# Patient Record
Sex: MALE | Race: OTHER | URBAN - METROPOLITAN AREA
[De-identification: names, ages, dates, MRNs, and addresses within clinical notes are randomized per-mention and may not be internally consistent; named-entity substitution may affect disease eponyms.]

---

## 2018-05-07 ENCOUNTER — EMERGENCY (EMERGENCY)
Facility: HOSPITAL | Age: 23
LOS: 1 days | Discharge: HOME | End: 2018-05-07
Admitting: EMERGENCY MEDICAL TECHNICIAN, BASIC

## 2018-05-07 VITALS
OXYGEN SATURATION: 98 % | HEART RATE: 76 BPM | DIASTOLIC BLOOD PRESSURE: 76 MMHG | SYSTOLIC BLOOD PRESSURE: 134 MMHG | RESPIRATION RATE: 18 BRPM

## 2018-05-07 DIAGNOSIS — K08.89 OTHER SPECIFIED DISORDERS OF TEETH AND SUPPORTING STRUCTURES: ICD-10-CM

## 2018-05-07 NOTE — CONSULT NOTE ADULT - SUBJECTIVE AND OBJECTIVE BOX
Patient is a 22y old  Male who presents with a chief complaint of pain in lower right from wisdom tooth.    HPI: Patient reports has cavity in tooth #32 and wants to make an appointment to extract it.      PAST MEDICAL & SURGICAL HISTORY:  No pertinent past medical history  No significant past surgical history    (-   ) heart valve replacement  ( -  ) joint replacement  (  - ) pregnancy    MEDICATIONS  (STANDING): none    MEDICATIONS  (PRN): none      REVIEW OF SYSTEMS      General:	-    Skin/Breast:-  	  Ophthalmologic:-  	-  ENMT:	-    Respiratory and Thorax:-  	  Cardiovascular:	-    Gastrointestinal:	-    Genitourinary:	-    Musculoskeletal:	-    Neurological:	-    Psychiatric:	-    Hematology/Lymphatics:	-    Endocrine:	-    Allergic/Immunologic:	-    Allergies -      Intolerances        FAMILY HISTORY:      *SOCIAL HISTORY: ( -  ) Tobacco; (   -) ETOH    Vital Signs Last 24 Hrs  T(C): --  T(F): --  HR: 76 (07 May 2018 13:42) (76 - 76)  BP: 134/76 (07 May 2018 13:42) (134/76 - 134/76)  BP(mean): --  RR: 18 (07 May 2018 13:42) (18 - 18)  SpO2: 98% (07 May 2018 13:42) (98% - 98%)    LABS:                  Last Dental Visit: Unknown     EOE:  TMJ (  - ) clicks                     (-   ) pops                     (  - ) crepitus             Mandible <<FROM>>             Facial bones and MOM <<grossly intact>>             (-   ) trismus             ( -  ) lymphadenopathy             ( -  ) swelling             ( -  ) asymmetry             ( -  ) palpation             (  - ) dyspnea             (-   ) dysphagia             (-   ) loss of consciousness    IOE:  <<permanent > dentition:            <<grossly intact>            <<multiple carious teeth>>Caries yes              hard/soft palate:  (   ) palatal torus, <<No pathology noted>>            tongue/FOM <<No pathology noted>>            labial/buccal mucosa <<No pathology noted>>           (  - ) percussion           (   -) palpation           (  - ) swelling            (  - ) abscess           (  - ) sinus tract  -  *DENTAL RADIOGRAPHS: Periapical taken     RADIOLOGY & ADDITIONAL STUDIES: Tooth #32 gross caries into nerve, periapical pathology    *ASSESSMENT: Upon clinical exam tooth #32 gross caries, pain on palpation and percussion, no swelling noted. Airway patent.     *PLAN:  Patient to go to oral surgeon to get tooth #32 extracted. Patient cannot make appointmetn at dental clinic as we do not accept his inurance. Patient states understands and agrees. Prescribed amoxicillin 500 mg X 21 tabs, and ibuprofen 600 mg.    PROCEDURE:   No treatment rendered. Patient to follow up privately.     RECOMMENDATIONS:  1) Dental F/U with outpatient dentist for comprehensive dental care.   3) If any difficulty swallowing/breathing, fever occur, return to ER.     Resident Name Caleb Smith

## 2018-05-07 NOTE — ED PROVIDER NOTE - OBJECTIVE STATEMENT
Pt is a 21y/o male with no sig pmhx presents today c/o right lower dental pain x 1 week. Pt denies drooling, edema, fever, chills.

## 2018-05-07 NOTE — ED PROVIDER NOTE - NS ED ROS FT
ENMT: +dental pain,  No hearing changes, pain, discharge or infections. No neck pain or stiffness.  MS:  No myalgia, muscle weakness, joint pain or back pain.  Neuro:  No headache or weakness.  No LOC.  Skin:  No skin rash.   Endocrine: No history of thyroid disease or diabetes.  Except as documented in the HPI,  all other systems are negative.

## 2018-05-07 NOTE — ED PROVIDER NOTE - PHYSICAL EXAMINATION
VITAL SIGNS: I have reviewed nursing notes and confirm.  CONSTITUTIONAL: Well-developed; well-nourished; in no acute distress.   SKIN: skin exam is warm and dry, no acute rash.    HEAD: Normocephalic; atraumatic.  EYES:  conjunctiva and sclera clear.  ENT: no visualized abscess, airway patent, No nasal discharge; airway clear.  EXT: Normal ROM.  No clubbing, cyanosis or edema.   NEURO: Alert, oriented, grossly unremarkable